# Patient Record
Sex: MALE
[De-identification: names, ages, dates, MRNs, and addresses within clinical notes are randomized per-mention and may not be internally consistent; named-entity substitution may affect disease eponyms.]

---

## 2024-01-01 ENCOUNTER — HOSPITAL ENCOUNTER (OUTPATIENT)
Age: 74
End: 2024-01-01

## 2024-01-01 PROCEDURE — 99207 PR NO BILLABLE SERVICE THIS VISIT: CPT | Performed by: INTERNAL MEDICINE

## 2024-01-11 NOTE — PROGRESS NOTES
Transfer Type: [facility]  Transfer Triage Note    Date of call: 01/10/24  Time of call: 11:34 PM    Current Patient Location:  [facility] ED  Current Level of Care: ED    Vitals: BP: wnl HR: tachy 110s O2 Sats: RA  Diagnosis: necrotizing pancreatitis with acutely increased abdominal pain  Reason for requested transfer: Further diagnostic work up, management, and consultation for specialized care   Isolation Needs: None    Care everywhere has been updated and reviewed: Yes  Necessary images have been sent through PACS: Yes (requested to be pushed to PACS)    If patient is transferring for specialty care or specific procedure, the specialist required has participated in the transfer call and agreed with need for transfer and anticipated timeline: Yes, Provider name: [facility] specialty with: GI panc bili    Transfer accepted: Yes  Stability of Patient: Patient is vitally stable, with no critical labs, and will likely remain stable throughout the transfer process  Is the patient appropriate for [facility]? No, What specific [place] needs are anticipated? ERCP possible  Level of Care Needed: Med Surg  Telemetry Needed:  None  Expected Time of Arrival for Transfer: greater than 24 hours  Arrival Location:  [facility]     Recommendations for Management and Stabilization: Given    Additional Comments: 73M who was diagnosed with acute pancreatitis about 2 months ago with recurrent admissions for abdominal symptoms at OSH. Recent CT in late December showed signs of panc necrosis. MRCP done yesterday showed over 50% pancreatic tissue w nectosis. 1.7x1.7cm fluid collection.  Presented on [facility] ED on 1/10 with worsening epigastric pain. No fever, WBC17K. , CRP10, normal Cre. Empirically started on IVF, IV meropenem. The local GI provider rec higher level of care for further eval and management.  Dr Momin was on the call,  agreed with transfer as he may need a procedure.  The ED provider is aware that it will be at least several days wait time until he gets a bed. Imaging requested to be pushed to PACS and Dr Momin will check them.    Sharon Cardona MD    Addendum 1/12/24  Notified by SOC that transfer request cancelled. Removed from triage list at this time.    Linda Hussein